# Patient Record
Sex: FEMALE | Race: AMERICAN INDIAN OR ALASKA NATIVE
[De-identification: names, ages, dates, MRNs, and addresses within clinical notes are randomized per-mention and may not be internally consistent; named-entity substitution may affect disease eponyms.]

---

## 2017-09-25 ENCOUNTER — HOSPITAL ENCOUNTER (EMERGENCY)
Dept: HOSPITAL 5 - ED | Age: 37
LOS: 1 days | Discharge: LEFT BEFORE DISCHARGE | End: 2017-09-26
Payer: SELF-PAY

## 2017-09-25 VITALS — SYSTOLIC BLOOD PRESSURE: 138 MMHG | DIASTOLIC BLOOD PRESSURE: 83 MMHG

## 2017-09-25 DIAGNOSIS — Z53.21: ICD-10-CM

## 2017-09-25 DIAGNOSIS — R07.9: Primary | ICD-10-CM

## 2017-09-25 LAB
ANION GAP SERPL CALC-SCNC: 18 MMOL/L
BASOPHILS NFR BLD AUTO: 0.7 % (ref 0–1.8)
BUN SERPL-MCNC: 8 MG/DL (ref 7–17)
BUN/CREAT SERPL: 13.33 %
CALCIUM SERPL-MCNC: 8.9 MG/DL (ref 8.4–10.2)
CHLORIDE SERPL-SCNC: 97.8 MMOL/L (ref 98–107)
CO2 SERPL-SCNC: 30 MMOL/L (ref 22–30)
EOSINOPHIL NFR BLD AUTO: 1.6 % (ref 0–4.3)
GLUCOSE SERPL-MCNC: 90 MG/DL (ref 65–100)
HCT VFR BLD CALC: 40 % (ref 30.3–42.9)
HGB BLD-MCNC: 13.2 GM/DL (ref 10.1–14.3)
MCH RBC QN AUTO: 29 PG (ref 28–32)
MCHC RBC AUTO-ENTMCNC: 33 % (ref 30–34)
MCV RBC AUTO: 89 FL (ref 79–97)
PLATELET # BLD: 286 K/MM3 (ref 140–440)
POTASSIUM SERPL-SCNC: 4.4 MMOL/L (ref 3.6–5)
RBC # BLD AUTO: 4.5 M/MM3 (ref 3.65–5.03)
SODIUM SERPL-SCNC: 141 MMOL/L (ref 137–145)
WBC # BLD AUTO: 12.4 K/MM3 (ref 4.5–11)

## 2017-09-25 PROCEDURE — 84484 ASSAY OF TROPONIN QUANT: CPT

## 2017-09-25 PROCEDURE — 70450 CT HEAD/BRAIN W/O DYE: CPT

## 2017-09-25 PROCEDURE — 36415 COLL VENOUS BLD VENIPUNCTURE: CPT

## 2017-09-25 PROCEDURE — 93005 ELECTROCARDIOGRAM TRACING: CPT

## 2017-09-25 PROCEDURE — 93010 ELECTROCARDIOGRAM REPORT: CPT

## 2017-09-25 PROCEDURE — 80048 BASIC METABOLIC PNL TOTAL CA: CPT

## 2017-09-25 PROCEDURE — 85025 COMPLETE CBC W/AUTO DIFF WBC: CPT

## 2018-06-04 ENCOUNTER — HOSPITAL ENCOUNTER (EMERGENCY)
Dept: HOSPITAL 5 - ED | Age: 38
Discharge: HOME | End: 2018-06-04
Payer: SELF-PAY

## 2018-06-04 VITALS — DIASTOLIC BLOOD PRESSURE: 69 MMHG | SYSTOLIC BLOOD PRESSURE: 121 MMHG

## 2018-06-04 DIAGNOSIS — J45.909: ICD-10-CM

## 2018-06-04 DIAGNOSIS — Z79.899: ICD-10-CM

## 2018-06-04 DIAGNOSIS — M79.644: Primary | ICD-10-CM

## 2018-06-04 DIAGNOSIS — Z90.710: ICD-10-CM

## 2018-06-04 DIAGNOSIS — Z88.0: ICD-10-CM

## 2018-06-04 DIAGNOSIS — Z98.51: ICD-10-CM

## 2018-06-04 DIAGNOSIS — M79.641: ICD-10-CM

## 2018-06-04 DIAGNOSIS — D57.1: ICD-10-CM

## 2018-06-04 PROCEDURE — 99283 EMERGENCY DEPT VISIT LOW MDM: CPT
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
## 2018-06-04 NOTE — XRAY REPORT
RIGHT HAND, 3 views:



History: Right hand pain.



The bony architecture is intact.  Bony alignment is normal. No

soft tissue abnormalities are seen.  The joint spaces appear

preserved. Previous internal fixation of a distal radial fracture is 

noted.



IMPRESSION:

Unremarkable right hand.

## 2019-01-20 ENCOUNTER — HOSPITAL ENCOUNTER (EMERGENCY)
Dept: HOSPITAL 5 - ED | Age: 39
LOS: 1 days | Discharge: HOME | End: 2019-01-21
Payer: SELF-PAY

## 2019-01-20 DIAGNOSIS — Z98.51: ICD-10-CM

## 2019-01-20 DIAGNOSIS — Z88.0: ICD-10-CM

## 2019-01-20 DIAGNOSIS — N83.202: Primary | ICD-10-CM

## 2019-01-20 DIAGNOSIS — J45.909: ICD-10-CM

## 2019-01-20 DIAGNOSIS — Z90.710: ICD-10-CM

## 2019-01-20 LAB
ALBUMIN SERPL-MCNC: 4 G/DL (ref 3.9–5)
ALT SERPL-CCNC: 16 UNITS/L (ref 7–56)
ANISOCYTOSIS BLD QL SMEAR: (no result)
BAND NEUTROPHILS # (MANUAL): 13.3 K/MM3
BILIRUB DIRECT SERPL-MCNC: < 0.2 MG/DL (ref 0–0.2)
BUN SERPL-MCNC: 8 MG/DL (ref 7–17)
BUN/CREAT SERPL: 16 %
CALCIUM SERPL-MCNC: 8.4 MG/DL (ref 8.4–10.2)
HCT VFR BLD CALC: 39.5 % (ref 30.3–42.9)
HEMOLYSIS INDEX: 204
HGB BLD-MCNC: 13.2 GM/DL (ref 10.1–14.3)
MCHC RBC AUTO-ENTMCNC: 33 % (ref 30–34)
MCV RBC AUTO: 88 FL (ref 79–97)
MYELOCYTES # (MANUAL): 0 K/MM3
PLATELET # BLD: 299 K/MM3 (ref 140–440)
PROMYELOCYTES # (MANUAL): 0 K/MM3
RBC # BLD AUTO: 4.49 M/MM3 (ref 3.65–5.03)
TOTAL CELLS COUNTED BLD: 100

## 2019-01-20 PROCEDURE — 36415 COLL VENOUS BLD VENIPUNCTURE: CPT

## 2019-01-20 PROCEDURE — 80076 HEPATIC FUNCTION PANEL: CPT

## 2019-01-20 PROCEDURE — 96374 THER/PROPH/DIAG INJ IV PUSH: CPT

## 2019-01-20 PROCEDURE — 96375 TX/PRO/DX INJ NEW DRUG ADDON: CPT

## 2019-01-20 PROCEDURE — 85007 BL SMEAR W/DIFF WBC COUNT: CPT

## 2019-01-20 PROCEDURE — 85025 COMPLETE CBC W/AUTO DIFF WBC: CPT

## 2019-01-20 PROCEDURE — 80048 BASIC METABOLIC PNL TOTAL CA: CPT

## 2019-01-20 PROCEDURE — 74176 CT ABD & PELVIS W/O CONTRAST: CPT

## 2019-01-20 PROCEDURE — 83690 ASSAY OF LIPASE: CPT

## 2019-01-20 PROCEDURE — 76856 US EXAM PELVIC COMPLETE: CPT

## 2019-01-20 PROCEDURE — 99284 EMERGENCY DEPT VISIT MOD MDM: CPT

## 2019-01-20 PROCEDURE — 76830 TRANSVAGINAL US NON-OB: CPT

## 2019-01-20 NOTE — CAT SCAN REPORT
FINAL REPORT



EXAM:  CT ABDOMEN PELVIS WO CON



HISTORY:  Abdominal Pain 



COMPARISON:  Pelvic ultrasound from November 2015. 



TECHNIQUE:  Contiguous axial images were obtained. Additional sagittal and coronal reformatted images
 were obtained. 



FINDINGS:  

Mild linear atelectasis at the lung bases. No calcified gallstones. Liver, spleen, pancreas and adren
al glands are grossly unremarkable. No nephrolithiasis or hydronephrosis. Aorta and IVC normal in nii
iber. 



Urinary bladder is unremarkable. No distal ureteral urinary bladder calculi. Uterus is surgically abs
ent. Bilateral ovaries appear to remain. Cystic structure within the left ovary measuring 5.0 x 4.0 c
entimeters in axial dimension. No free fluid or lymphadenopathy in the pelvic cavity. Prior tubal lig
ation. 



The appendix is normal in caliber. Several fluid-filled small bowel loops in the abdomen. Some of the
 bowel loops are borderline dilated measuring up to 2.7 centimeters. Findings are concerning for mild
 enteritis with reactive ileus. No king bowel obstruction. No free air pneumatosis. 



Moderate focal degenerative changes L5-S1 level. Bony pelvis is grossly intact. 



IMPRESSION:  

Findings concerning for mild enteritis with reactive ileus. Developing partial small bowel obstructio
n is not excluded. No king bowel obstruction. The appendix is normal in caliber. 



5.0 x 4.0 centimeter left adnexal cystic structure likely ovarian. This could be further evaluated by
 pelvic ultrasound if clinically indicated. Followup pelvic ultrasound within 6-8 weeks would be sugjosé miguel alves to ensure stability versus resolution.

## 2019-01-20 NOTE — ULTRASOUND REPORT
FINAL REPORT



EXAM:  US TRANSVAGINAL



HISTORY:  adnexa structure seen on CT 



COMPARISON:  CT abdomen and pelvis from the same date. 



TECHNIQUE:  Several real-time grayscale and color Doppler images were obtained. Transabdominal and tr
ansvaginal exam. 



FINDINGS:  

Uterus is surgically absent. Right ovary measures 4.3 x 3.1 x 2.6 centimeters. Left ovary measures 4.
5 x 3.9 x 4.6 centimeters. There is vascular flow to the ovaries. Within the left ovary, there is a 4
.0 by 2.7 by 3.8 centimeter complex cystic structure with multiple septations. No internal vascularit
y. Trace fluid at the margin of the left ovary. 



IMPRESSION:  

Complex left ovarian cystic structure which may be hemorrhagic or proteinaceous measuring up to 4.0 c
entimeters. Followup pelvic ultrasound 6-8 weeks suggested to ensure resolution versus stability. 



Right ovary is unremarkable. 



Uterus is surgically absent.

## 2019-01-20 NOTE — EMERGENCY DEPARTMENT REPORT
<PAIGE CHAHAL - Last Filed: 19 21:39>





ED Abdominal Pain HPI





- General


Chief Complaint: Abdominal Pain


Stated Complaint: ABD PAIN


Time Seen by Provider: 19 15:18


Source: patient


Mode of arrival: Ambulatory


Limitations: No Limitations





- History of Present Illness


Initial Comments: 





38-year-old -American female range of motion department complaining of a 

couple day history of lower abdominal pain has been waxing and waning in sterile

fashion, associated with swelling.  She reports no vaginal discharge.  No 

diarrhea, constipation, has had some nausea, chest pain, palpitations.  History 

of having a hysterectomy about which she says for 5 years ago has been having 

some vaginal bleeding about 4 times a year lasting 2-3 days.  States the 

difference with this vaginal bleeding.  Was a little heavier and having a little

bit worse cramps


MD Complaint: abdominal pain


-: Gradual


Radiation: none


Migration to: no migration, suprapubic


Severity scale (0 -10): 6


Quality: cramping, aching


Consistency: constant


Improves With: nothing


Worsens With: nothing


Associated Symptoms: denies: diarrhea, fever, constipation, dysuria, 

hematemesis, hematochezia, melena, hematuria, anorexia





- Related Data


                                Home Medications











 Medication  Instructions  Recorded  Confirmed  Last Taken


 


Ketorolac [Toradol] 10 mg PO Q6H PRN 19 09:00








                                  Previous Rx's











 Medication  Instructions  Recorded  Last Taken  Type


 


ALBUTEROL Inhaler (OR & NICU) 2 puff IH QID PRN #1 inhalation 01/26/15 01/10/19 

21:00 Rx





[ProAir HFA Inhaler]    


 


HYDROcodone/APAP 5-325 [Tyler 1 each PO Q6HR PRN #20 tablet 19 Unknown Rx





5/325]    











                                    Allergies











Allergy/AdvReac Type Severity Reaction Status Date / Time


 


Penicillins Allergy  Anaphylaxis Verified 19 11:34














ED Review of Systems


Constitutional: denies: chills, fever


Eyes: denies: eye pain, eye discharge, vision change


ENT: denies: ear pain, throat pain


Respiratory: denies: cough, shortness of breath, wheezing


Cardiovascular: denies: chest pain, palpitations


Endocrine: no symptoms reported


Gastrointestinal: denies: abdominal pain, nausea, diarrhea


Genitourinary: denies: urgency, dysuria, discharge


Musculoskeletal: denies: back pain, joint swelling, arthralgia


Skin: denies: rash, lesions


Neurological: denies: headache, weakness, paresthesias


Psychiatric: denies: anxiety, depression


Hematological/Lymphatic: denies: easy bleeding, easy bruising





ED Past Medical Hx





- Past Medical History


Hx Congestive Heart Failure: No


Hx Diabetes: No


Hx Sickle Cell Disease: Yes (SCT)


Hx Asthma: Yes


Hx COPD: No


Hx HIV: No


Additional medical history: History of chest pain





- Surgical History


Past Surgical History?: Yes


Additional Surgical History: tubal ligations x3, hysterectomy





- Social History


Smoking Status: Never Smoker


Substance Use Type: None





- Medications


Home Medications: 


                                Home Medications











 Medication  Instructions  Recorded  Confirmed  Last Taken  Type


 


ALBUTEROL Inhaler (OR & NICU) 2 puff IH QID PRN #1 inhalation 01/26/15 02/12/19 

01/10/19 21:00 Rx





[ProAir HFA Inhaler]     


 


Ketorolac [Toradol] 10 mg PO Q6H PRN 19 09:00 History


 


HYDROcodone/APAP 5-325 [Tyler 1 each PO Q6HR PRN #20 tablet 19  Unknown Rx





5/325]     














ED Physical Exam





- General


Limitations: No Limitations


General appearance: alert, in no apparent distress





- Head


Head exam: Present: atraumatic, normocephalic





- Eye


Eye exam: Present: normal appearance, PERRL, EOMI


Pupils: Present: normal accommodation





- ENT


ENT exam: Present: mucous membranes moist





- Neck


Neck exam: Present: normal inspection, tenderness, full ROM





- Respiratory


Respiratory exam: Present: normal lung sounds bilaterally.  Absent: respiratory 

distress, wheezes, rhonchi, stridor, chest wall tenderness, accessory muscle use





- Cardiovascular


Cardiovascular Exam: Present: regular rate, normal rhythm.  Absent: systolic 

murmur, diastolic murmur, rubs, gallop





- GI/Abdominal


GI/Abdominal exam: Present: soft, normal bowel sounds





- Extremities Exam


Extremities exam: Present: normal inspection





- Back Exam


Back exam: Present: normal inspection





- Neurological Exam


Neurological exam: Present: alert, oriented X3





- Psychiatric


Psychiatric exam: Present: normal affect, normal mood





- Skin


Skin exam: Present: warm, dry, intact, normal color.  Absent: rash





ED Medical Decision Making





- Lab Data


Result diagrams: 


                                 19 15:41





                                 19 15:41





ED Disposition


Clinical Impression: 


 Chronic pelvic pain in female, Gastroenteritis, Complex cyst of left ovary





Menorrhagia


Qualifiers:


 Menorrahagia type: with regular cycle Qualified Code(s): N92.0 - Excessive and 

frequent menstruation with regular cycle





Disposition: - TO HOME OR SELFCARE


Condition: Stable


Instructions:  Ovarian Cyst (ED), Gastroenteritis (ED), Abdominal Pain (ED)


Additional Instructions: 


Please follow up with your OB/GYN at [facility] in 2-3 days


See alternate OB/GYN if needed.


Take medication as prescribed


Increase  fluid intake


Referrals: 


[facility] [Provider Group] - 24 Hours


[facility] [Provider Group] - 3-5 Days


PRIMARY CARE,MD [Primary Care Provider] - 3-5 Days


JOSE RAMON MATHEWS MD [Staff Physician] - 2-3 Days


Forms:  Work/School Release Form(ED)





<PAMELA BOX - Last Filed: 19 13:46>





ED Review of Systems


ROS: 


Stated complaint: ABD PAIN


Other details as noted in HPI








ED Course


                                   Vital Signs











  19





  15:01 18:22 00:26


 


Temperature 98.8 F  99.6 F


 


Pulse Rate 103 H  90


 


Respiratory 20 18 17





Rate   


 


Blood Pressure 145/92  


 


Blood Pressure   121/79





[Right]   


 


O2 Sat by Pulse 99  100





Oximetry   














ED Medical Decision Making





- Lab Data


Result diagrams: 


                                 19 15:41





                                 19 15:41





- Radiology Data


Radiology results: report reviewed


CT scan of the abdomen and pelvis without contrast,Transvaginal and pelvic ultra

sound non-OB dictated by radiologist and report reviewed by myself.  


Please see report below.


Findings


[facility] 


[address] [place] [address] 





Cat Scan Report 


Signed 





Patient: JANETH FRAZIER MR#: [id number] 


: 1980 Acct:[id number] 


Age/Sex: 38 / F ADM Date: 19 


Loc: ED 


Attending Dr: 








Ordering Physician: FRANCES SHIELDS 


Date of Service: 19 


Procedure(s): CT abdomen pelvis wo con 


Accession Number(s): [id number] 





cc: FRANCES SHIELDS 








FINAL REPORT 





EXAM: CT ABDOMEN PELVIS WO CON 





HISTORY: Abdominal Pain 





COMPARISON: Pelvic ultrasound from 2015. 





TECHNIQUE: Contiguous axial images were obtained. Additional sagittal and 

coronal reformatted 


images were obtained. 





FINDINGS: 


Mild linear atelectasis at the lung bases. No calcified gallstones. Liver, 

spleen, pancreas and 


adrenal glands are grossly unremarkable. No nephrolithiasis or hydronephrosis. 

Aorta and IVC normal 


in caliber. 





Urinary bladder is unremarkable. No distal ureteral urinary bladder calculi. 

Uterus is surgically 


absent. Bilateral ovaries appear to remain. Cystic structure within the left 

ovary measuring 5.0 x 


4.0 centimeters in axial dimension. No free fluid or lymphadenopathy in the 

pelvic cavity. Prior 


tubal ligation. 





The appendix is normal in caliber. Several fluid-filled small bowel loops in the

 abdomen. Some of 


the bowel loops are borderline dilated measuring up to 2.7 centimeters. Findings

 are concerning for 


mild enteritis with reactive ileus. No king bowel obstruction. No free air pneu

matosis. 





Moderate focal degenerative changes L5-S1 level. Bony pelvis is grossly intact. 





IMPRESSION: 


Findings concerning for mild enteritis with reactive ileus. Developing partial 

small bowel 


obstruction is not excluded. No king bowel obstruction. The appendix is normal 

in caliber. 





5.0 x 4.0 centimeter left adnexal cystic structure likely ovarian. This could be

 further evaluated 


by pelvic ultrasound if clinically indicated. Followup pelvic ultrasound within 

6-8 weeks would be 


suggested to ensure stability versus resolution. 











Transcribed By: LILY 


Dictated By: FERMIN RAMIREZ MD 


Electronically Authenticated By: FERMIN RAMIREZ MD 


Signed Date/Time: 19 











DD/DT: 19 


TD/TT: 19





Findings


[facility] 


[address] [place] [address] 





Ultrasound Report 


Signed 





Patient: JANETH FRAZIER MR#: [id number] 


: 1980 Acct:[id number] 


Age/Sex: 38 / F ADM Date: 19 


Loc: ED 


Attending Dr: 








Ordering Physician: FRANCES SHIELDS 


Date of Service: 19 


Procedure(s): US pelvic complete 


Accession Number(s): [id number] 





cc: FRANCES SHIELDS 








FINAL REPORT 





EXAM: US PELVIC COMPLETE 





HISTORY: adnexa structure seen on CT 





COMPARISON: CT abdomen and pelvis from the same date. 





TECHNIQUE: Several real-time grayscale and color Doppler images were obtained. 

Transabdominal and 


transvaginal exam. 





FINDINGS: 


Uterus is surgically absent. Right ovary measures 4.3 x 3.1 x 2.6 centimeters. 

Left ovary measures 


4.5 x 3.9 x 4.6 centimeters. There is vascular flow to the ovaries. Within the 

left ovary, there is 


a 4.0 by 2.7 by 3.8 centimeter complex cystic structure with multiple 

septations. No internal 


vascularity. Trace fluid at the margin of the left ovary. 





IMPRESSION: 


Complex left ovarian cystic structure which may be hemorrhagic or proteinaceous 

measuring up to 4.0


centimeters. Followup pelvic ultrasound 6-8 weeks suggested to ensure resolution

 versus stability. 





Right ovary is unremarkable. 





Uterus is surgically absent. 











Transcribed By: LMA 


Dictated By: FERMIN RAMIREZ MD 


Electronically Authenticated By: FERMIN RAMIREZ MD 


Signed Date/Time: 19 











DD/DT: 19 


TD/TT: 19


Findings


[facility] 


[address] [place] [address] 





Ultrasound Report 


Signed 





Patient: JANETH FRAZIER MR#: [id number] 


: 1980 Acct:[id number] 


Age/Sex: 38 / F ADM Date: 19 


Loc: ED 


Attending Dr: 








Ordering Physician: FRANCES SHIELDS 


Date of Service: 19 


Procedure(s): US transvaginal 


Accession Number(s): [id number] 





cc: FRANCES SHIELDS 








FINAL REPORT 





EXAM: US TRANSVAGINAL 





HISTORY: adnexa structure seen on CT 





COMPARISON: CT abdomen and pelvis from the same date. 





TECHNIQUE: Several real-time grayscale and color Doppler images were obtained. 

Transabdominal and 


transvaginal exam. 





FINDINGS: 


Uterus is surgically absent. Right ovary measures 4.3 x 3.1 x 2.6 centimeters. 

Left ovary measures 


4.5 x 3.9 x 4.6 centimeters. There is vascular flow to the ovaries. Within the 

left ovary, there is 


a 4.0 by 2.7 by 3.8 centimeter complex cystic structure with multiple 

septations. No internal 


vascularity. Trace fluid at the margin of the left ovary. 





IMPRESSION: 


Complex left ovarian cystic structure which may be hemorrhagic or proteinaceous 

measuring up to 4.0


centimeters. Followup pelvic ultrasound 6-8 weeks suggested to ensure resolution

 versus stability. 





Right ovary is unremarkable. 





Uterus is surgically absent. 











Transcribed By: LMA 


Dictated By: FERMIN RAMIREZ MD 


Electronically Authenticated By: FERMIN RAMIREZ MD 


Signed Date/Time: 19 











DD/DT: 19 


TD/TT: 19





- Medical Decision Making





I was asked to follow-up on this patient for her transvaginal and pelvic 

ultrasound non-OB.  Ultrasound was dictated by radiologist and report reviewed 

by myself.  Patient with good blood flow to both ovaries without any tension of 

ovarian torsion.  Ultrasound results shows Complex left ovarian cystic structure

 which may be hemorrhagic or proteinaceous measuring up to 4.0 centimeters. 

Followup pelvic ultrasound 6-8 weeks suggested to ensure resolution versus 

stability. Right ovary is unremarkable. Uterus is surgically absent.  Patient is

 okay to be discharged.  Previous provider completed discharge information and 

this will be given to patient's and she goes to [facility] so I told her to 

follow up at [facility] in 2-3 days and that they recommend that she has 

ultrasound repeated in 6-8 weeks.  She voiced understanding and and discharged 

home in stable condition without any distress.  Pain is controlled.  Vital signs

 stable and she is a febrile.


Critical care attestation.: 


If time is entered above; I have spent that time in minutes in the direct care 

of this critically ill patient, excluding procedure time.








ED Disposition


Is pt being admited?: No


Does the pt Need Aspirin: No

## 2019-01-20 NOTE — ULTRASOUND REPORT
FINAL REPORT



EXAM:  US PELVIC COMPLETE



HISTORY:  adnexa structure seen on CT 



COMPARISON:  CT abdomen and pelvis from the same date. 



TECHNIQUE:  Several real-time grayscale and color Doppler images were obtained. Transabdominal and tr
ansvaginal exam. 



FINDINGS:  

Uterus is surgically absent. Right ovary measures 4.3 x 3.1 x 2.6 centimeters. Left ovary measures 4.
5 x 3.9 x 4.6 centimeters. There is vascular flow to the ovaries. Within the left ovary, there is a 4
.0 by 2.7 by 3.8 centimeter complex cystic structure with multiple septations. No internal vascularit
y. Trace fluid at the margin of the left ovary. 



IMPRESSION:  

Complex left ovarian cystic structure which may be hemorrhagic or proteinaceous measuring up to 4.0 c
entimeters. Followup pelvic ultrasound 6-8 weeks suggested to ensure resolution versus stability. 



Right ovary is unremarkable. 



Uterus is surgically absent.

## 2019-01-21 VITALS — DIASTOLIC BLOOD PRESSURE: 79 MMHG | SYSTOLIC BLOOD PRESSURE: 121 MMHG

## 2019-02-12 ENCOUNTER — HOSPITAL ENCOUNTER (OUTPATIENT)
Dept: HOSPITAL 5 - OR | Age: 39
Discharge: HOME | End: 2019-02-12
Attending: OBSTETRICS & GYNECOLOGY
Payer: SELF-PAY

## 2019-02-12 VITALS — SYSTOLIC BLOOD PRESSURE: 134 MMHG | DIASTOLIC BLOOD PRESSURE: 80 MMHG

## 2019-02-12 DIAGNOSIS — Z83.3: ICD-10-CM

## 2019-02-12 DIAGNOSIS — Z79.899: ICD-10-CM

## 2019-02-12 DIAGNOSIS — Z82.61: ICD-10-CM

## 2019-02-12 DIAGNOSIS — N83.202: Primary | ICD-10-CM

## 2019-02-12 DIAGNOSIS — Z90.710: ICD-10-CM

## 2019-02-12 DIAGNOSIS — Z88.0: ICD-10-CM

## 2019-02-12 DIAGNOSIS — Z87.440: ICD-10-CM

## 2019-02-12 DIAGNOSIS — Z98.51: ICD-10-CM

## 2019-02-12 DIAGNOSIS — J45.909: ICD-10-CM

## 2019-02-12 DIAGNOSIS — E66.9: ICD-10-CM

## 2019-02-12 DIAGNOSIS — Z72.89: ICD-10-CM

## 2019-02-12 DIAGNOSIS — Z82.49: ICD-10-CM

## 2019-02-12 PROCEDURE — C9250 ARTISS FIBRIN SEALANT: HCPCS

## 2019-02-12 PROCEDURE — 88305 TISSUE EXAM BY PATHOLOGIST: CPT

## 2019-02-12 PROCEDURE — A4217 STERILE WATER/SALINE, 500 ML: HCPCS

## 2019-02-12 PROCEDURE — 58661 LAPAROSCOPY REMOVE ADNEXA: CPT

## 2019-02-12 PROCEDURE — 81025 URINE PREGNANCY TEST: CPT

## 2019-02-12 RX ADMIN — HYDROMORPHONE HYDROCHLORIDE PRN MG: 1 INJECTION, SOLUTION INTRAMUSCULAR; INTRAVENOUS; SUBCUTANEOUS at 16:10

## 2019-02-12 RX ADMIN — HYDROMORPHONE HYDROCHLORIDE PRN MG: 1 INJECTION, SOLUTION INTRAMUSCULAR; INTRAVENOUS; SUBCUTANEOUS at 15:52

## 2019-02-12 NOTE — SHORT STAY SUMMARY
Short Stay Documentation


Date of service: 19


Narrative H&P: 





Pt is a 37yo BF  LMP 2014 s/p [facility] presents for surgical evaluation and 

treatment of a persistent complex left ovarian cyst 4.5 x 3.9 x 4.6 cm ovary 

with a 4.0 x 2.7 x 3.8cm cyst.   - WNL. She complains of recurrent pelvic 

pain, and has had the same left cyst removed twice before and now desires a Left

Oophorectomy.





- History


Principal diagnosis: Persistent complex left ovarian cyst 


H&P: obtained from office


Past Medical History: other (Asthma)


Past Surgical History: hysterectomy, Other (BTL)


Social history: no significant social history, 





- Allergies and Medications


Current Medications: 


                                    Allergies





Penicillins Allergy (Verified 19 11:34)


   Anaphylaxis





                                Home Medications











 Medication  Instructions  Recorded  Confirmed  Last Taken  Type


 


ALBUTEROL Inhaler (OR & NICU) 2 puff IH QID PRN #1 inhalation 01/26/15 02/12/19 

01/10/19 21:00 Rx





[ProAir HFA Inhaler]     


 


Ketorolac [Toradol] 10 mg PO Q6H PRN 19 09:00 History








Active Medications





Albuterol (Proventil)  2.5 mg IH PREOP NR


   Stop: 19 16:00


   Last Admin: 19 11:35 Dose:  2.5 mg


   Documented by: 


Celecoxib (Celebrex)  200 mg PO PREOP NR


   Stop: 19 16:00


Gabapentin (Neurontin)  300 mg PO PREOP NR


   Stop: 19 16:00


   Last Admin: 19 11:36 Dose:  300 mg


   Documented by: 


Lactated Ringer's (Lactated Ringers)  1,000 mls @ 75 mls/hr IV AS DIRECT ESTELA


   Stop: 19 23:59


Midazolam HCl (Versed)  2 mg IV PREOP NR


   Stop: 19 23:59


   Last Admin: 19 11:36 Dose:  2 mg


   Documented by: 


Scopolamine (Transderm-Scop)  1 each TD PREOP NR


   Stop: 02/15/19 09:59


   Last Admin: 19 11:36 Dose:  1 each


   Documented by: 











- Physical exam


General appearance: mild distress


Integumentary: no rash


HEENT: Atraumatic


Lungs: Clear to auscultation


Breasts: deferred


Heart: Regular rate


Gastrointestinal: normal


Female Genitourinary: deferred


Rectal Exam: deferred


Extremities: no ischemia, No edema


Neurological: Normal gait, Normal speech





- Brief post op/procedure progress note


Date of procedure: 19


Pre-op diagnosis: 1.  Pain   2.  Complex left ovarian cyst


Post-op diagnosis: same


Procedure: 





Laparoscopic left salpingo-oophorectomy


Anesthesia: GETA


Findings: 





An absent uterus and cervix.  Left fallopian tube and ovary densely adherent to 

the left pelvic sidewall and the pelvic cul-de-sac.  Normal right ovary with 

Filshie clip on the remnant right fallopian tube.


Surgeon: SINAN DAMON


Estimated blood loss: minimal


Pathology: list (left fallopian tube and ovary; 2 Filshie clips)


Specimen disposition: to lab


Condition: stable





- Hospital course


Hospital course: 





Unremarkable.





- Disposition


Condition at discharge: Good


Disposition: DC- TO HOME OR SELFCARE





- Discharge Diagnoses


(1) Complex cyst of left ovary


Status: Resolved   





(2) Chronic pelvic pain in female


Status: Chronic   





Short Stay Discharge Plan


Activity: no restrictions


Diet: regular


Follow up with: 


GABI OBANDO MD [Primary Care Provider] - 7 Days


SINAN DAMON MD [Staff Physician] - 14 Days


Prescriptions: 


HYDROcodone/APAP 5-325 [Nellis 5/325] 1 each PO Q6HR PRN #20 tablet


 PRN Reason: Pain

## 2019-02-12 NOTE — ANESTHESIA CONSULTATION
Anesthesia Consult and Med Hx


Date of service: 02/12/19





- Airway


Anesthetic Teeth Evaluation: Good


ROM Head & Neck: Adequate


Mental/Hyoid Distance: Adequate


Mallampati Class: Class II


Intubation Access Assessment: Probably Good





- Pulmonary Exam


CTA: Yes





- Cardiac Exam


Cardiac Exam: RRR





- Pre-Operative Health Status


ASA Pre-Surgery Classification: ASA2


Proposed Anesthetic Plan: General





- Pulmonary


Hx Smoking: No


Hx Asthma: Yes (last inhaler use 2m ago; hx intubation in 2005 for exacerbation)


Hx Respiratory Symptoms: No


SOB: No


Home Oxygen Therapy: No





- Cardiovascular System


Hx Hypertension: No


Hx Heart Attack/AMI: No


Hx Percutaneous Transluminal Coronary Angioplasty (PTCA): No


Hx Cardia Arrhythmia: No


Hx Valvular Heart Disease: Yes (MVP)





- Central Nervous System


Hx Seizures: No


CVA: No


Hx Psychiatric Problems: No





- Gastrointestinal


Hx Gastroesophageal Reflux Disease: No





- Endocrine


Hx Renal Disease: No


Hx Liver Disease: No


Hx Insulin Dependent Diabetes: No


Hx Non-Insulin Dependent Diabetes: No


Hx Thyroid Disease: No





- Other Systems


Hx Alcohol Use: Yes (Occas)


Hx Obesity: Yes





- Additional Comments


Anesthesia Medical History Comments: Hx PONV with previous anesthetics.

## 2019-02-12 NOTE — OPERATIVE REPORT
Operative Report


Operative Report: 





Date of procedure: 02/12/2019





Pre-operative diagnosis: 1.  Chronic pelvic pain   2.  Complex left ovarian cyst





Post-operative diagnosis: Same





Procedure name(s): Laparoscopic left salpingo-oophorectomy





Surgeon: Gonsalo Zuniga MD





Assistant: None





Anesthesia: Gen. endotracheal intubation by Dr. Galvan





EBL: 20 mL's





Findings: An absent uterus and cervix.  Left fallopian tube and ovary densely 

adherent to the left pelvic sidewall and the pelvic cul-de-sac.  Normal right 

ovary with Filshie clip on the remnant right fallopian tube.





Procedure: After the patient was correctly identified, she was prepped and 

draped in usual sterile fashion and placed in dorsal lithotomy position.  First 

the bladder was emptied using a straight catheter, and the sponge stick was 

placed in the vaginal vault since the cervix was absent.  Attention was then 

turned to the abdomen where first a periumbilical incision was made using a skin

knife, an Optiview trocar was inserted under direct visualization.  

Visualization of the pelvic organs found the uterus to be absent, and the left 

fallopian tube and ovary were densely adherent to the left pelvic sidewall and 

pelvic cul-de-sac.  The right ovary appeared to be normal with a Filshie clip on

the remnant right fallopian tube.  Next a suprapubic incision and a left lateral

incision was made through which 5 mm trochars were 80 to use in manipulating the

pelvic organs.  Tripolar cautery was used to clamp cauterized and cut the left 

infundibulopelvic ligament, and dissecting the left ovary and adherent left 

fallopian tube from the pelvic sidewall and the pelvic cul-de-sac.  After the 

specimen was removed from the pelvic sidewall was placed in an Endopouch and 

sent to pathology.  The right ovary was noted to be normal, and the Filshie clip

which was attached  to the remnant right fallopian tube was removed.  Copious 

amounts of irrigation was then performed, and good hemostasis was assured.  The 

Tisseel sealant was sprayed over the left salpingo-oophorectomy site.  At this 

point the procedure was considered complete.  All instruments removed from the 

abdomen, and the abdomen was deflated.  The periumbilical incision was closed 

using 0 Vicryl suture in a figure-of-eight configuration on the fascia followed 

by 4 Monocryl suture in symmetrical fashion on the skin.  The suprapubic and 

left lateral incisions were closed in similar fashion.  Each incision was 

infiltrated using 0.5% Marcaine solution.  The sponge stick was removed, the 

patient tolerated the procedure well and was transported to recovery in stable 

condition.
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
